# Patient Record
Sex: MALE | Race: BLACK OR AFRICAN AMERICAN | NOT HISPANIC OR LATINO | Employment: FULL TIME | URBAN - METROPOLITAN AREA
[De-identification: names, ages, dates, MRNs, and addresses within clinical notes are randomized per-mention and may not be internally consistent; named-entity substitution may affect disease eponyms.]

---

## 2023-09-19 ENCOUNTER — APPOINTMENT (OUTPATIENT)
Dept: LAB | Facility: CLINIC | Age: 63
End: 2023-09-19
Payer: COMMERCIAL

## 2023-09-19 ENCOUNTER — OFFICE VISIT (OUTPATIENT)
Dept: FAMILY MEDICINE CLINIC | Facility: CLINIC | Age: 63
End: 2023-09-19
Payer: COMMERCIAL

## 2023-09-19 VITALS
OXYGEN SATURATION: 99 % | SYSTOLIC BLOOD PRESSURE: 120 MMHG | BODY MASS INDEX: 21.58 KG/M2 | TEMPERATURE: 98.6 F | DIASTOLIC BLOOD PRESSURE: 76 MMHG | HEART RATE: 84 BPM | WEIGHT: 142.38 LBS | HEIGHT: 68 IN

## 2023-09-19 DIAGNOSIS — Z11.59 NEED FOR HEPATITIS C SCREENING TEST: ICD-10-CM

## 2023-09-19 DIAGNOSIS — Z12.5 PROSTATE CANCER SCREENING: ICD-10-CM

## 2023-09-19 DIAGNOSIS — Z00.00 ANNUAL PHYSICAL EXAM: ICD-10-CM

## 2023-09-19 DIAGNOSIS — Z13.0 SCREENING FOR DEFICIENCY ANEMIA: ICD-10-CM

## 2023-09-19 DIAGNOSIS — L60.8 DISCOLORED NAILS: ICD-10-CM

## 2023-09-19 DIAGNOSIS — Z13.220 LIPID SCREENING: ICD-10-CM

## 2023-09-19 DIAGNOSIS — Z13.1 DIABETES MELLITUS SCREENING: ICD-10-CM

## 2023-09-19 DIAGNOSIS — Z12.11 COLON CANCER SCREENING: Primary | ICD-10-CM

## 2023-09-19 LAB
ALBUMIN SERPL BCP-MCNC: 5 G/DL (ref 3.5–5)
ALP SERPL-CCNC: 45 U/L (ref 34–104)
ALT SERPL W P-5'-P-CCNC: 16 U/L (ref 7–52)
ANION GAP SERPL CALCULATED.3IONS-SCNC: 8 MMOL/L
AST SERPL W P-5'-P-CCNC: 20 U/L (ref 13–39)
BILIRUB SERPL-MCNC: 0.89 MG/DL (ref 0.2–1)
BUN SERPL-MCNC: 13 MG/DL (ref 5–25)
CALCIUM SERPL-MCNC: 9.2 MG/DL (ref 8.4–10.2)
CHLORIDE SERPL-SCNC: 106 MMOL/L (ref 96–108)
CHOLEST SERPL-MCNC: 181 MG/DL
CO2 SERPL-SCNC: 27 MMOL/L (ref 21–32)
CREAT SERPL-MCNC: 0.73 MG/DL (ref 0.6–1.3)
ERYTHROCYTE [DISTWIDTH] IN BLOOD BY AUTOMATED COUNT: 12.7 % (ref 11.6–15.1)
GFR SERPL CREATININE-BSD FRML MDRD: 100 ML/MIN/1.73SQ M
GLUCOSE P FAST SERPL-MCNC: 83 MG/DL (ref 65–99)
HCT VFR BLD AUTO: 44.6 % (ref 36.5–49.3)
HDLC SERPL-MCNC: 57 MG/DL
HGB BLD-MCNC: 15 G/DL (ref 12–17)
LDLC SERPL CALC-MCNC: 114 MG/DL (ref 0–100)
MCH RBC QN AUTO: 28.4 PG (ref 26.8–34.3)
MCHC RBC AUTO-ENTMCNC: 33.6 G/DL (ref 31.4–37.4)
MCV RBC AUTO: 85 FL (ref 82–98)
NONHDLC SERPL-MCNC: 124 MG/DL
PLATELET # BLD AUTO: 266 THOUSANDS/UL (ref 149–390)
PMV BLD AUTO: 10.2 FL (ref 8.9–12.7)
POTASSIUM SERPL-SCNC: 3.8 MMOL/L (ref 3.5–5.3)
PROT SERPL-MCNC: 7.5 G/DL (ref 6.4–8.4)
PSA SERPL-MCNC: 0.47 NG/ML (ref 0–4)
RBC # BLD AUTO: 5.28 MILLION/UL (ref 3.88–5.62)
SODIUM SERPL-SCNC: 141 MMOL/L (ref 135–147)
TRIGL SERPL-MCNC: 48 MG/DL
VIT B12 SERPL-MCNC: 466 PG/ML (ref 180–914)
WBC # BLD AUTO: 4.19 THOUSAND/UL (ref 4.31–10.16)

## 2023-09-19 PROCEDURE — 80053 COMPREHEN METABOLIC PANEL: CPT

## 2023-09-19 PROCEDURE — 80061 LIPID PANEL: CPT

## 2023-09-19 PROCEDURE — 36415 COLL VENOUS BLD VENIPUNCTURE: CPT

## 2023-09-19 PROCEDURE — 85027 COMPLETE CBC AUTOMATED: CPT

## 2023-09-19 PROCEDURE — 99386 PREV VISIT NEW AGE 40-64: CPT | Performed by: STUDENT IN AN ORGANIZED HEALTH CARE EDUCATION/TRAINING PROGRAM

## 2023-09-19 PROCEDURE — G0103 PSA SCREENING: HCPCS

## 2023-09-19 PROCEDURE — 82607 VITAMIN B-12: CPT

## 2023-09-19 NOTE — PROGRESS NOTES
ADULT ANNUAL [id number]  [address] PRIMARY CARE    NAME: Kelsy Ortiz  AGE: 66 y.o. SEX: male  : 1960     DATE: 2023     Assessment and Plan:     Problem List Items Addressed This Visit        Other    Annual physical exam     Patient is a 62-year-old male with no significant past medical history, has not been seen by a provider in over 5 years. At this time we will check CBC, CMP, lipid panel, PSA, vitamin B12. Patient has never had colon cancer screening, has no family history of colon cancer at this time will order FIT test.         Discolored nails     1 month of Dark discoloration down middle of left middle finger. Will check Vit B12 level and will refer to dermatology for evaluation. Relevant Orders    Ambulatory Referral to Dermatology    Vitamin B12   Other Visit Diagnoses     Colon cancer screening    -  Primary    Relevant Orders    Occult Blood, Fecal Immunochemical    Need for hepatitis C screening test        Lipid screening        Relevant Orders    Lipid panel    Diabetes mellitus screening        Relevant Orders    Comprehensive metabolic panel    Screening for deficiency anemia        Relevant Orders    CBC and Platelet    Prostate cancer screening        Relevant Orders    PSA, Total Screen          Immunizations and preventive care screenings were discussed with patient today. Appropriate education was printed on patient's after visit summary. Discussed risks and benefits of prostate cancer screening. We discussed the controversial history of PSA screening for prostate cancer in the [facility] as well as the risk of over detection and over treatment of prostate cancer by way of PSA screening.   The patient understands that PSA blood testing is an imperfect way to screen for prostate cancer and that elevated PSA levels in the blood may also be caused by infection, inflammation, prostatic trauma or manipulation, urological procedures, or by benign prostatic enlargement. The role of the digital rectal examination in prostate cancer screening was also discussed and I discussed with him that there is large interobserver variability in the findings of digital rectal examination. Counseling:  Alcohol/drug use: discussed moderation in alcohol intake, the recommendations for healthy alcohol use, and avoidance of illicit drug use. Dental Health: discussed importance of regular tooth brushing, flossing, and dental visits. · Exercise: the importance of regular exercise/physical activity was discussed. Recommend exercise 3-5 times per week for at least 30 minutes. BMI Counseling: Body mass index is 21.84 kg/m². The BMI is above normal. Nutrition recommendations include encouraging healthy choices of fruits and vegetables. Exercise recommendations include strength training exercises. Rationale for BMI follow-up plan is due to patient being overweight or obese. Return in about 1 year (around 9/19/2024) for Annual physical.     Chief Complaint:     Chief Complaint   Patient presents with   • Establish Care      History of Present Illness:     Adult Annual Physical   Patient here for a comprehensive physical exam. The patient reports no problems. Patient is a 73-year-old male with no significant past medical history presenting today to establish care and for annual physical.    Diet and Physical Activity  · Diet/Nutrition: well balanced diet. · Exercise: no formal exercise and Very physically demanding job. Depression Screening  PHQ-2/9 Depression Screening         General Health  · Sleep: sleeps well. · Hearing: normal - bilateral.  · Vision: no vision problems and most recent eye exam >1 year ago. · Dental: no dental visits for >1 year.         Health  · Symptoms include: none     Alcohol: Drink recreationally years ago, no alcohol in past 30 years  Tobacco: Never smoker    Family Hx:  Mom passed away from [facility] in her 66's  Dad passed away at 80 last year, had dementia  Has 5 siblings, all healthy, but one sister morbidly obese and psychiatric issues    Auto  at ADD     Review of Systems:     Review of Systems   Constitutional: Negative for chills and fever. HENT: Negative for congestion and rhinorrhea. Respiratory: Negative for cough and shortness of breath. Gastrointestinal: Negative for abdominal pain, constipation, diarrhea, nausea and vomiting. Neurological: Negative for dizziness and headaches. Past Medical History:     History reviewed. No pertinent past medical history. Past Surgical History:     Past Surgical History:   Procedure Laterality Date   • NO PAST SURGERIES        Family History:     Family History   Problem Relation Age of Onset   • Lung cancer Mother       Social History:     Social History     Socioeconomic History   • Marital status: /Civil Union     Spouse name: None   • Number of children: None   • Years of education: None   • Highest education level: None   Occupational History   • None   Tobacco Use   • Smoking status: Never     Passive exposure: Never   • Smokeless tobacco: Never   Vaping Use   • Vaping Use: Never used   Substance and Sexual Activity   • Alcohol use: Not Currently     Comment: Last drink 30 years ago   • Drug use: Not Currently     Types: Marijuana     Comment: Not used for 30 years   • Sexual activity: Yes     Partners: Female   Other Topics Concern   • None   Social History Narrative   • None     Social Determinants of Health     Financial Resource Strain: Not on file   Food Insecurity: Not on file   Transportation Needs: Not on file   Physical Activity: Not on file   Stress: Not on file   Social Connections: Not on file   Intimate Partner Violence: Not on file   Housing Stability: Not on file      Current Medications:     No current outpatient medications on file. No current facility-administered medications for this visit. Allergies: Allergies   Allergen Reactions   • Penicillin G Other (See Comments)      Physical Exam:     /76 (BP Location: Left arm, Patient Position: Sitting, Cuff Size: Adult)   Pulse 84   Temp 98.6 °F (37 °C) (Temporal)   Ht 5' 7.7" (1.72 m)   Wt 64.6 kg (142 lb 6 oz)   SpO2 99%   BMI 21.84 kg/m²     Physical Exam  Vitals reviewed. Constitutional:       Appearance: Normal appearance. HENT:      Head: Normocephalic and atraumatic. Mouth/Throat:      Mouth: Mucous membranes are moist.      Pharynx: No oropharyngeal exudate or posterior oropharyngeal erythema. Eyes:      Extraocular Movements: Extraocular movements intact. Cardiovascular:      Rate and Rhythm: Normal rate and regular rhythm. Heart sounds: Normal heart sounds. Pulmonary:      Effort: Pulmonary effort is normal.      Breath sounds: Normal breath sounds. Skin:     Comments: Hyperpigmented line down middle of left middle finger nail. Neurological:      General: No focal deficit present. Mental Status: He is alert and oriented to person, place, and time.    Psychiatric:         Mood and Affect: Mood normal.         Behavior: Behavior normal.          Ramon Arrington DO  [address] [facility]IMARY [facility]

## 2023-09-19 NOTE — ASSESSMENT & PLAN NOTE
Patient is a 79-year-old male with no significant past medical history, has not been seen by a provider in over 5 years. At this time we will check CBC, CMP, lipid panel, PSA, vitamin B12.   Patient has never had colon cancer screening, has no family history of colon cancer at this time will order FIT test.

## 2023-09-19 NOTE — ASSESSMENT & PLAN NOTE
1 month of Dark discoloration down middle of left middle finger. Will check Vit B12 level and will refer to dermatology for evaluation.

## 2023-09-21 ENCOUNTER — TELEPHONE (OUTPATIENT)
Dept: FAMILY MEDICINE CLINIC | Facility: CLINIC | Age: 63
End: 2023-09-21

## 2023-09-21 NOTE — TELEPHONE ENCOUNTER
Patient is calling very worried about his lab results. Requesting a phone back. He is comfortable if you need leave a detail message with results.  Thank you

## 2023-09-21 NOTE — TELEPHONE ENCOUNTER
Returned patient call to review labs. Reassured him that all is well. 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 Discussed small changes in diet to help reduce cholesterol. Patient understands and agrees with plan.

## 2023-10-17 ENCOUNTER — TELEPHONE (OUTPATIENT)
Age: 63
End: 2023-10-17

## 2023-10-17 NOTE — TELEPHONE ENCOUNTER
Pt calling back for an appt, was placed on waitlist earlier this month    Pt says he has a brown line going down his fingernail. He has hx of skin cancer on his mothers side    Advised pt of no openings, but could we have him seen at [facility]? Please call pt back at [phone] thank you!

## 2023-10-18 NOTE — TELEPHONE ENCOUNTER
Spoke to pt who is scheduled for 11/2 at [facility] in the [facility], pt asked for me to send him a text with [employer] info but the new Epic won't allow me to do it.

## 2023-11-02 ENCOUNTER — OFFICE VISIT (OUTPATIENT)
Dept: DERMATOLOGY | Facility: CLINIC | Age: 63
End: 2023-11-02
Payer: COMMERCIAL

## 2023-11-02 VITALS — WEIGHT: 144 LBS | HEIGHT: 68 IN | TEMPERATURE: 97.8 F | BODY MASS INDEX: 21.82 KG/M2

## 2023-11-02 DIAGNOSIS — L60.8 DISCOLORED NAILS: ICD-10-CM

## 2023-11-02 PROCEDURE — 99243 OFF/OP CNSLTJ NEW/EST LOW 30: CPT | Performed by: DERMATOLOGY

## 2023-11-02 NOTE — PROGRESS NOTES
[facility] Note     Patient Name: Sammie Robles  Encounter Date: 11/2/2023     Have you been cared for by a [facility]ologist in the last 3 years and, if so, which description applies to you? NO. I am considered a "new" patient and must complete all patient intake questions. I am MALE/not capable of bearing children. REVIEW OF SYSTEMS:  Have you recently had or currently have any of the following? Recent fever or chills? No  Any non-healing wound? No   PAST MEDICAL HISTORY:  Have you personally ever had or currently have any of the following? If "YES," then please provide more detail. Skin cancer (such as Melanoma, Basal Cell Carcinoma, Squamous Cell Carcinoma? No  Tuberculosis, HIV/AIDS, Hepatitis B or C: No  Radiation Treatment No   HISTORY OF IMMUNOSUPPRESSION:   Do you have a history of any of the following:  Systemic Immunosuppression such as Diabetes, Biologic or Immunotherapy, Chemotherapy, Organ Transplantation, Bone Marrow Transplantation? No     Answering "YES" requires the addition of the dotphrase "IMMUNOSUPPRESSED" as the first diagnosis of the patient's visit. FAMILY HISTORY:  Any "first degree relatives" (parent, brother, sister, or child) with the following? Skin Cancer, Pancreatic or Other Cancer? YES, mother with lung cancer    PATIENT EXPERIENCE:    Do you want the Dermatologist to perform a COMPLETE skin exam today including a clinical examination under the "bra and underwear" areas? NO  If necessary, do we have your permission to call and leave a detailed message on your Preferred Phone number that includes your specific medical information? Yes      Allergies   Allergen Reactions   • Penicillin G Other (See Comments)    No current outpatient medications on file. Whom besides the patient is providing clinical information about today's encounter?    NO ADDITIONAL HISTORIAN (patient alone provided history)    Physical Exam and Assessment/Plan by Diagnosis:    MELANONYCHIA    Physical Exam:  Anatomic Location Affected:  left 3rd finger, left 4th finger, left thumb, right 2nd finger  Morphological Description:  left 3rd finger 1mm wide uniform brown macule     Media Information        Additional History of Present Condition:  appeared about 2 months ago. He states he does work with his hands. Does recall hitting his hand as well about 1-2 months ago. He states he does recall slamming his 3rd finger in the door about 2 years ago. 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 Denies any pain. Wife advised he have it examined     Assessment and Plan:  Based on a thorough discussion of this condition and the management approach to it (including a comprehensive discussion of the known risks, side effects and potential benefits of treatment), the patient (family) agrees to implement the following specific plan: Will monitor for now  Photos in chart    If pigment starts to develop on the skin near the nail come in sooner   Follow up in 1 year to recheck nails    What is melanonychia? Melanonychia is characterised by brown to black discolouration of a finger or toe nail. Longitudinal melanonychia describes a pigmented band and is due to melanin within the nail plate. Longitudinal melanonychia is most often benign and arises from a  pigmented melanocytic naevus (a mole) or a lentigo (a freckle). However, a band of brown pigment in a single nail must be examined and investigated with caution, as melanonychia may be the presenting sign of melanoma of the nail unit. Who is at risk of melanonychia? Melanonychia occurs more commonly in dark-skinned individuals (Lombardo skin type V and VI). Studies suggest that nearly all Afro-Caribbeans will develop black-brown pigmentation of the nails by the age of 48. It may also be present in up to 20% of [place] patients.  White-skinned people are less commonly affected. Melanonychia can present in individuals of all ages, including children, and affects both sexes equally. What causes melanonychia? The nail (nail plate) is a hard and translucent structure that is not normally pigmented and is made of the skin protein, keratin. Pigmentation results from the deposition of melanin by the pigment cells, or melanocytes. These typically lie dormant in the nail matrix where the nail originates. As the melanin is continuously deposited in the keratinocytic cells of the growing nail, a longitudinal streak arises. This is termed longitudinal melanonychia. This deposition of melanin can result from 2 broad processes; melanocytic activation or melanocytic hyperplasia. Melanocytic activation  Melanocytic activation is an increase in the production and deposition of melanin into the nail cells (onychocytes), without an increase in the number of melanocytes. There are a number of causes.     Physiological (functional)  Racial variation  Pregnancy  Trauma  Nail biting  Friction: foot deformity, unsuitable footwear  Carpal tunnel syndrome  Inflammatory skin disease  Psoriasis  Lichen planus  Amyloidosis  Onychomycosis (nail infection)  Nonmelanocytic lesions  Intraepidermal carcinoma  Basal cell carcinoma  Viral warts  Endocrine disorders  Darnell disease  Cushing syndrome  Hyperthyroidism  Acromegaly  Other systemic disease  Haemosiderosis (iron overload)  Porphyria cutanea tarda  Human immunodeficiency virus infection (HIV)  Systemic lupus erythematosus  Systemic sclerosis  Syndrome-associations  Laugier-Hunziker syndrome  Peutz-Jeghers syndrome  Touraine syndrome  Iatrogenic (caused by medical treatment)  Phototherapy  X-ray exposure; electron beam therapy  Medication-related  Chemotherapy agents (especially hydroxyurea, busulfan, bleomycin, adriamycin, doxorubicin, cyclophosphamide, 5-fluorouracil)  Anti-malarial therapy    Melanocytic hyperplasia  Melanocytic hyperplasia refers to an increased number of the pigment cells (melanocytes) within the nail matrix. This can represent either a benign or a malignant process. Benign  Lentigines are seen more commonly in adults  Melanocytic naevi are seen more commonly in children. Histologically these are differentiated by the absence or presence of melanocyte nests  Malignant  Melanoma of the nail unit most commonly affects the thumbs, index fingers and big toes    Pigmentation due to external sources  Pathogens can also cause melanonychia. Some gram-negative bacteria (Proteus mirabilis, Klebsiella, Pseudomonas) and dermatophytes (Trichophyton rubrum) can produce melanin that stains the nail. Other pathogens involved in infections of the nail (onychomycosis, paronychia) can stimulate inflammation that activates the melanocytes. The pigment may be exogenous and deposited on the top of the nail plate. Possible sources include: Tobacco  Potassium permanganate  Silver nitrate  Dirt  Sasha  Hair dye    What are the clinical features of melanonychia? Melanonychia typically manifests as a single brown-black pigmented streak. It may affect a single nail, or be observed in multiple nails. Longitudinal melanonychia extends from the nail fold (cuticle) to the free edge of the nail. Complete melanonychia involves the entire nail plate and is due to pigment in the proximal nail matrix. Partial longitudinal melanonychia can be due to distal tumour under the nail plate. Transverse melanonychia runs across the nail. Transverse melanonychia is rare, and is often associated with iatrogenic causes such as irradiation and medications. Grey colour often indicates melanocytic activation, whereas a brown band is due to melanocytic hyperplasia. Evaluate whether the pigment band has a regular parallel pattern (benign) or an irregular pattern with loss of parallelism and irregular dots (potential melanoma).    Nail matrix melanoma affects both finger and toenails and most frequent in index, thumb, and the large toe. How is the diagnosis made? Physical examination must involve inspection of all 20 nails, nail folds and mucosal membranes to detect associated signs that may suggest the underlying cause. In particular, pigmentation of mucosal membranes is associated with Laugier-Hunziker syndrome and [place] disease. Pathogen-induced pigmentation should be suspected when pigmentation appears at the nail edge or can be removed by scraping. Exogenous pigmentation grows-out with the nail plate, or may similarly be scraped off. Ascorbic acid at 10% concentration may be used to remove potassium permanganate staining. Dermoscopy: examination of benign longitudinal melanonychia should reveal light to dark brown lines or bands that are parallel, regular in colour, thickness and spacing as the band extends from the nail fold to the free edge. The borders should be clearly defined and usually of a width of less than 3 mm. Dermatoscopic examination from the free edge of the nail may help reveal the origin of the implicated melanocytes. The proximal nail matrix produces the superficial (dorsal) nail plate, whereas the distal nail matrix and nail bed produces the deep (ventral) nail plate. An International Dermoscopy Society study concluded that acquired longitudinal melanonychias in adults should give rise to suspicion of  nail unit melanoma if any of the following are present. Involvement of more than two-thirds of the nail plate   Grey or black in addition to brown colour  Irregular brown pigmentation  Granular pigmentation  And/or nail dystrophy. Additional factors of concern include:  New pigment, especially if there is no preceding trauma or other explanation  Recurrent spontaneous haemorrhage in the same site  Variation in colour and thickness of the pigment band.    Nail biopsy: definitive exclusion of melanoma of the nail unit is obtained with a nail matrix biopsy. There should be a low threshold for biopsy especially in elderly patients where melanonychia has appeared in a single digit. What are the differential diagnoses of melanonychia? The most important differential to exclude is melanoma of the nail unit. Features that should raise suspicion are given by the ABCDEF mnemonic:  A: Age >47 years old  B: Georganne How to black, blurred borders, breadth >3mm   C: Changes of melanonychia or nail plate   D: Digit: single digit, especially thumb, big toe and index finger   E: Extension of pigment into nail fold (Regalado sign)   F: Family or personal history of melanoma    Another common differential is subungual haematoma (bleeding under the nail). This is typically preceded by trauma to the affected digit, although this may be unnoticed trauma. It can often be due to tight shoes. On dermatoscopy, the pigment is red to purple in colour and presents as dots or globules with a well-circumscribed edge closest to the cuticle. Within a few weeks, the purple patch may begin moving outwards towards the free edge of the nail. What are the potential complications of melanonychia? Biopsy can result in nail plate deformity. Melanoma can cause subungual haemorrhage, unsightly nail dystrophy with fissuring and splitting of the nail plate. What is the management of melanonychia? Where melanonychia is attributed to a benign cause, no further treatment is necessary. The management of melanoma of the nail unit requires complete excision of the tumour, which may require amputation of part of the digit. What is the outlook for patients with melanonychia? Kathye Khalil tends to persist, except when it is related to medication - in which case it fades following withdrawal of the medication. Nail matrix melanoma tends to have a poor prognosis.     Scribe Attestation    I,:  Roib Miller am acting as a scribe while in the presence of the attending physician.:       I,:  Jax Tellez Odilia Nesbitt MD personally performed the services described in this documentation    as scribed in my presence.:       Patient was seen and discussed with Dr. Cristy Richards PA-C

## 2023-11-02 NOTE — PATIENT INSTRUCTIONS
Assessment and Plan:  Based on a thorough discussion of this condition and the management approach to it (including a comprehensive discussion of the known risks, side effects and potential benefits of treatment), the patient (family) agrees to implement the following specific plan:   Will monitor for now  Photo in chart    If pigment starts to develop on the skin near the nail come in sooner   Follow up in 1 year

## 2024-09-30 ENCOUNTER — APPOINTMENT (OUTPATIENT)
Dept: URGENT CARE | Facility: CLINIC | Age: 64
End: 2024-09-30

## 2025-01-16 ENCOUNTER — TELEPHONE (OUTPATIENT)
Dept: FAMILY MEDICINE CLINIC | Facility: CLINIC | Age: 65
End: 2025-01-16

## 2025-01-29 ENCOUNTER — TELEPHONE (OUTPATIENT)
Dept: FAMILY MEDICINE CLINIC | Facility: CLINIC | Age: 65
End: 2025-01-29

## 2025-01-29 NOTE — TELEPHONE ENCOUNTER
Recent PHQ 2/9 Score    PHQ 2:  Date PHQ 2 Score   5/24/2018 0       PHQ 9:        The patient was last seen on 2023 Please arrange an appointment with Dr. Rodriguez